# Patient Record
Sex: MALE | Race: WHITE
[De-identification: names, ages, dates, MRNs, and addresses within clinical notes are randomized per-mention and may not be internally consistent; named-entity substitution may affect disease eponyms.]

---

## 2017-06-22 ENCOUNTER — HOSPITAL ENCOUNTER (OUTPATIENT)
Dept: HOSPITAL 61 - PCVCCLINIC | Age: 82
Discharge: HOME | End: 2017-06-22
Attending: INTERNAL MEDICINE
Payer: MEDICARE

## 2017-06-22 DIAGNOSIS — E78.00: Primary | ICD-10-CM

## 2017-06-22 DIAGNOSIS — Z88.8: ICD-10-CM

## 2017-06-22 DIAGNOSIS — Z79.82: ICD-10-CM

## 2017-06-22 DIAGNOSIS — I10: ICD-10-CM

## 2017-06-22 DIAGNOSIS — M48.00: ICD-10-CM

## 2017-06-22 DIAGNOSIS — M25.552: ICD-10-CM

## 2017-06-22 DIAGNOSIS — N13.8: ICD-10-CM

## 2017-06-22 DIAGNOSIS — N40.1: ICD-10-CM

## 2017-06-22 DIAGNOSIS — I25.10: ICD-10-CM

## 2017-06-22 DIAGNOSIS — Z79.899: ICD-10-CM

## 2017-06-22 PROCEDURE — G0463 HOSPITAL OUTPT CLINIC VISIT: HCPCS

## 2017-06-22 PROCEDURE — 93005 ELECTROCARDIOGRAM TRACING: CPT

## 2017-11-21 ENCOUNTER — HOSPITAL ENCOUNTER (EMERGENCY)
Dept: HOSPITAL 35 - ER | Age: 82
Discharge: HOME | End: 2017-11-21
Payer: COMMERCIAL

## 2017-11-21 VITALS — BODY MASS INDEX: 26.6 KG/M2 | WEIGHT: 189.99 LBS | HEIGHT: 71 IN

## 2017-11-21 DIAGNOSIS — I10: Primary | ICD-10-CM

## 2017-11-21 DIAGNOSIS — N40.0: ICD-10-CM

## 2017-11-21 DIAGNOSIS — E78.00: ICD-10-CM

## 2017-11-21 DIAGNOSIS — Z88.8: ICD-10-CM

## 2017-11-21 DIAGNOSIS — Z87.891: ICD-10-CM

## 2017-11-21 DIAGNOSIS — F10.99: ICD-10-CM

## 2017-11-21 LAB
ANION GAP SERPL CALC-SCNC: 6 MMOL/L (ref 7–16)
BUN SERPL-MCNC: 15 MG/DL (ref 7–18)
CALCIUM SERPL-MCNC: 9.1 MG/DL (ref 8.5–10.1)
CHLORIDE SERPL-SCNC: 104 MMOL/L (ref 98–107)
CO2 SERPL-SCNC: 30 MMOL/L (ref 21–32)
CREAT SERPL-MCNC: 0.7 MG/DL (ref 0.7–1.3)
ERYTHROCYTE [DISTWIDTH] IN BLOOD BY AUTOMATED COUNT: 13.5 % (ref 10.5–14.5)
GLUCOSE SERPL-MCNC: 105 MG/DL (ref 74–106)
HCT VFR BLD CALC: 42.8 % (ref 42–52)
HGB BLD-MCNC: 14 GM/DL (ref 14–18)
MCH RBC QN AUTO: 27.7 PG (ref 26–34)
MCHC RBC AUTO-ENTMCNC: 32.7 G/DL (ref 28–37)
MCV RBC: 84.7 FL (ref 80–100)
PLATELET # BLD: 231 THOU/UL (ref 150–400)
POTASSIUM SERPL-SCNC: 4.2 MMOL/L (ref 3.5–5.1)
RBC # BLD AUTO: 5.05 MIL/UL (ref 4.5–6)
SODIUM SERPL-SCNC: 140 MMOL/L (ref 136–145)
TROPONIN I SERPL-MCNC: < 0.04 NG/ML (ref ?–0.06)
WBC # BLD AUTO: 6.4 THOU/UL (ref 4–11)

## 2017-11-22 ENCOUNTER — HOSPITAL ENCOUNTER (OUTPATIENT)
Dept: HOSPITAL 61 - PCVCCLINIC | Age: 82
Discharge: HOME | End: 2017-11-22
Attending: INTERNAL MEDICINE
Payer: MEDICARE

## 2017-11-22 DIAGNOSIS — E78.00: ICD-10-CM

## 2017-11-22 DIAGNOSIS — Z87.891: ICD-10-CM

## 2017-11-22 DIAGNOSIS — I25.10: Primary | ICD-10-CM

## 2017-11-22 DIAGNOSIS — I10: ICD-10-CM

## 2017-11-22 DIAGNOSIS — R93.1: ICD-10-CM

## 2017-11-22 DIAGNOSIS — Z79.899: ICD-10-CM

## 2017-11-22 DIAGNOSIS — Z79.82: ICD-10-CM

## 2017-11-22 DIAGNOSIS — I87.2: ICD-10-CM

## 2017-11-22 PROCEDURE — G0463 HOSPITAL OUTPT CLINIC VISIT: HCPCS

## 2017-11-22 PROCEDURE — 93005 ELECTROCARDIOGRAM TRACING: CPT

## 2017-11-27 ENCOUNTER — HOSPITAL ENCOUNTER (OUTPATIENT)
Dept: HOSPITAL 61 - PCVCIMAG | Age: 82
Discharge: HOME | End: 2017-11-27
Attending: INTERNAL MEDICINE
Payer: MEDICARE

## 2017-11-27 DIAGNOSIS — E83.59: ICD-10-CM

## 2017-11-27 DIAGNOSIS — I25.10: ICD-10-CM

## 2017-11-27 DIAGNOSIS — E78.5: ICD-10-CM

## 2017-11-27 DIAGNOSIS — I10: ICD-10-CM

## 2017-11-27 DIAGNOSIS — I08.1: Primary | ICD-10-CM

## 2017-11-27 PROCEDURE — 76770 US EXAM ABDO BACK WALL COMP: CPT

## 2017-11-27 PROCEDURE — 93306 TTE W/DOPPLER COMPLETE: CPT

## 2017-11-27 PROCEDURE — 93975 VASCULAR STUDY: CPT

## 2017-11-27 NOTE — PCVCIMAG
--------------- APPROVED REPORT --------------





Study performed:  2017 09:04:28



EXAM: Comprehensive 2D, Doppler, and color-flow 

Echocardiogram

Patient Location: Echo lab

Status:  routine



BSA:         2.06

HR: 83 bpmBP:          160/90 mmHg

Rhythm: NSR



Other Information 

Study Quality: Adequate



Risk Factors: 

Cardiac Risk Factors:  HTN, Hyperlipidemia



Indications

Coronary atherosclerosis by elevated calcium score



2D Dimensions

LVEF(%):  48.10 (&gt;50%)

IVSd:  14.07 (7-11mm)LVOT Diam:  21.35 (18-24mm) 

LVDd:  45.68 mm

PWd:  14.53 (7-11mm)Ascending Ao:  32.95 (22-36mm)

LVDs:  34.66 (25-40mm)

Left Atrium:  36.85 (27-40mm)

Aortic Root:  28.44 mm

LV Single Plane 4CH:  68.76 %

LV Single Plane 2CH:  62.22 %Wilkinson's LVEF:  65.49 %

Biplane EF:  64.4 %



Volumes

Left Atrial Volume (Systole)

Single Plane 4CH:  84.05 mLSingle Plane 2CH:  56.59 mL

LA ESV Index:  34.00 mL/m2



Aortic Valve

AoV Peak Jos.:  1.55 m/s

AO Peak Gr.:  9.63 mmHgLVOT Max P.06 mmHg

LVOT Max V:  1.01 m/s

JAMES Vmax: 2.32 cm2



Mitral Valve

E/A Ratio:  0.7

MV Decel. Time:  232.99 ms

MV E Max Jos.:  0.99 m/s

MV A Jos.:  1.42 m/s

IVRT:  107.27 ms



Pulmonary Valve

PV Peak Gr.:  2.44 mmHg



Tricuspid Valve

TR Peak Jos.:  2.38 m/s

TR Peak Gr.:  22.57 mmHg



Left Ventricle

The left ventricle is normal size. There is normal LV segmental wall 

motion. There is normal left ventricular wall thickness. Left 

ventricular systolic function is normal. The left ventricular 

ejection fraction is within the normal range. LVEF is 65%. Grade I - 

abnormal relaxation pattern.



Right Ventricle

The right ventricle is normal size. The right ventricular systolic 

function is normal.



Atria

The left atrium size is normal. The right atrium size is 

normal.



Aortic Valve

The aortic valve is normal in structure. No aortic regurgitation is 

present. There is no aortic valvular stenosis.



Mitral Valve

Severe mitral annular calcification. Moderately calcified mitral 

leaflets without stenosis. Trace mitral regurgitation. No evidence of 

mitral valve stenosis.



Tricuspid Valve

The tricuspid valve is normal in structure. Mild tricuspid 

regurgitation with PAP of 30 mmHg.



Pulmonic Valve

The pulmonary valve is normal in structure. There is no pulmonic 

valvular regurgitation.



Great Vessels

The aortic root is normal in size. IVC is normal in size and 

collapses with &gt;50% inspiration



Pericardium

There is no pericardial effusion.



&lt;Conclusion&gt;

The left ventricle is normal size.

There is normal left ventricular wall thickness.

LVEF is 65%.

Grade I - abnormal relaxation pattern.

The right ventricle is normal size.

The left atrium size is normal.

The aortic valve is normal in structure.

Severe mitral annular calcification. Moderately calcified mitral 

leaflets without stenosis.

Trace mitral regurgitation.

Mild tricuspid regurgitation with PAP of 30 mmHg.

There is no pericardial effusion.

## 2017-11-27 NOTE — PCVCIMAG
EXAM: BILATERAL RENAL ULTRASOUND AND BILATERAL RENAL DUPLEX



INDICATION: Hypertension



FINDINGS: 

Right kidney: Length measures 12.0 cm. No hydronephrosis or extensive

renal scarring. Incidental note is made of a 1.1 cm shadowing stone in

the lower pole.



Right renal duplex: Adequate technical quality. No sonographic

evidence of renal artery stenosis. The aortic to renal artery ratio is

1.7. The renal vein is patent.



Left kidney: Length measures 12.6 cm. No hydronephrosis or extensive

renal scarring.



Left renal duplex: Adequate technical quality. No sonographic evidence

of renal artery stenosis. The aortic to renal artery ratio is 1.8. The

renal vein is patent.



Bladder: No obvious abnormalities.



IMPRESSION: 

No significant renal artery stenosis.

No hydronephrosis bilaterally.

Nonobstructing right lower pole 1.1 cm calculus.



LOC:[id number]

## 2018-04-16 ENCOUNTER — HOSPITAL ENCOUNTER (OUTPATIENT)
Dept: HOSPITAL 61 - PCVCCLINIC | Age: 83
Discharge: HOME | End: 2018-04-16
Attending: INTERNAL MEDICINE
Payer: MEDICARE

## 2018-04-16 DIAGNOSIS — I10: Primary | ICD-10-CM

## 2018-04-16 DIAGNOSIS — Z82.49: ICD-10-CM

## 2018-04-16 DIAGNOSIS — Z79.82: ICD-10-CM

## 2018-04-16 DIAGNOSIS — E78.00: ICD-10-CM

## 2018-04-16 DIAGNOSIS — R93.1: ICD-10-CM

## 2018-04-16 DIAGNOSIS — I87.2: ICD-10-CM

## 2018-04-16 DIAGNOSIS — Z87.891: ICD-10-CM

## 2018-04-16 PROCEDURE — 93005 ELECTROCARDIOGRAM TRACING: CPT

## 2018-04-16 PROCEDURE — 80061 LIPID PANEL: CPT

## 2018-08-02 ENCOUNTER — APPOINTMENT (RX ONLY)
Dept: URBAN - METROPOLITAN AREA CLINIC 142 | Facility: CLINIC | Age: 83
Setting detail: DERMATOLOGY
End: 2018-08-02

## 2018-08-02 DIAGNOSIS — L91.8 OTHER HYPERTROPHIC DISORDERS OF THE SKIN: ICD-10-CM

## 2018-08-02 DIAGNOSIS — L82.1 OTHER SEBORRHEIC KERATOSIS: ICD-10-CM

## 2018-08-02 DIAGNOSIS — D22 MELANOCYTIC NEVI: ICD-10-CM

## 2018-08-02 DIAGNOSIS — D18.0 HEMANGIOMA: ICD-10-CM

## 2018-08-02 PROBLEM — E78.5 HYPERLIPIDEMIA, UNSPECIFIED: Status: ACTIVE | Noted: 2018-08-02

## 2018-08-02 PROBLEM — D22.5 MELANOCYTIC NEVI OF TRUNK: Status: ACTIVE | Noted: 2018-08-02

## 2018-08-02 PROBLEM — D18.01 HEMANGIOMA OF SKIN AND SUBCUTANEOUS TISSUE: Status: ACTIVE | Noted: 2018-08-02

## 2018-08-02 PROBLEM — I10 ESSENTIAL (PRIMARY) HYPERTENSION: Status: ACTIVE | Noted: 2018-08-02

## 2018-08-02 PROCEDURE — ? COUNSELING

## 2018-08-02 PROCEDURE — 99202 OFFICE O/P NEW SF 15 MIN: CPT

## 2018-08-02 ASSESSMENT — PAIN INTENSITY VAS: HOW INTENSE IS YOUR PAIN 0 BEING NO PAIN, 10 BEING THE MOST SEVERE PAIN POSSIBLE?: NO PAIN

## 2018-08-02 ASSESSMENT — LOCATION SIMPLE DESCRIPTION DERM
LOCATION SIMPLE: UPPER BACK
LOCATION SIMPLE: ABDOMEN
LOCATION SIMPLE: RIGHT THIGH
LOCATION SIMPLE: CHEST

## 2018-08-02 ASSESSMENT — LOCATION DETAILED DESCRIPTION DERM
LOCATION DETAILED: PERIUMBILICAL SKIN
LOCATION DETAILED: INFERIOR THORACIC SPINE
LOCATION DETAILED: RIGHT ANTERIOR LATERAL PROXIMAL THIGH
LOCATION DETAILED: LEFT LATERAL SUPERIOR CHEST

## 2018-08-02 ASSESSMENT — LOCATION ZONE DERM
LOCATION ZONE: TRUNK
LOCATION ZONE: LEG

## 2018-09-24 ENCOUNTER — HOSPITAL ENCOUNTER (OUTPATIENT)
Dept: HOSPITAL 61 - PCVCIMAG | Age: 83
Discharge: HOME | End: 2018-09-24
Attending: INTERNAL MEDICINE
Payer: MEDICARE

## 2018-09-24 DIAGNOSIS — I10: Primary | ICD-10-CM

## 2018-09-24 DIAGNOSIS — E78.5: ICD-10-CM

## 2018-09-24 DIAGNOSIS — I25.10: ICD-10-CM

## 2018-09-24 PROCEDURE — 93325 DOPPLER ECHO COLOR FLOW MAPG: CPT

## 2018-09-24 PROCEDURE — 93351 STRESS TTE COMPLETE: CPT

## 2018-09-24 NOTE — PCVCIMAG
--------------- APPROVED REPORT --------------





Study performed:  09/24/2018 10:24:29



Exam:  Stress Echocardiogram

Indication: Hyperlipidemia, Hypertension, Abnormal Calcium 

Score

Patient Location: Echo lab

Stress Nurse: Radha Franklin RN

Status: routine



Ht: 5 ft 11 in  

HR: 65 bpm      BP: 160/90 mmHg

Rhythm: NSR



Medical History

Medical History: Hyperlipidemia, HTN



Procedure

The patient underwent an Exercise Stress Test using the Tomas 

Protocol. Blood pressure, heart rate, and EKG were monitored.

An Echocardiogram was performed by technician in four stages in quad 

fashion.  At peak stress, four selected images were obtained and 

placed side by side with resting images for comparison.



Stress Test Details

Stress Test:  Exercise stress testing was performed using a Tomas 

protocol.

HR

Resting HR:            65 bpmMax Heart Rate (APMHR): 137 bpm 

Max HR Achieved:  125 bpmTarget HR (85% APMHR): 116 bpm

% of APMHR:         91

HR response to stress: Normal HR response to stress



BP

Resting BP:  160/90 mmHg

Max BP:       172/76 mmHg



ECG

Resting ECG:  Sinus Rhythm

Stress ECG:     Sinus Rhythm

Recovery ECG: Sinus Rhythm



Clinical

Reason for Termination: Maximal effort

Exercise duration: 6 min sec

Highest Stage Achieved: Stage 2: 2.5 mph at 12% grade. 

Exercise capacity: 7.00 METs

Overall Exercise Capacity for Age: Normal



Pre-Stress Echo

The resting Echocardiogram showed normal left ventricular 

contractility with an estimated Ejection Fraction of about &gt;55%. 



Post-Stress Echo

The stress Echocardiogram showed normal left ventricular 

contractility with an estimated Ejection Fraction of about 60-65%. 



Conclusion

Clinical Response:  Non-ischemic

Exercise Capacity:  Average

Stress ECG Response:  Non-ischemic

Stress Echo Images:  Non-ischemic



Other Information

Study Quality: Good

## 2019-06-24 ENCOUNTER — HOSPITAL ENCOUNTER (OUTPATIENT)
Dept: HOSPITAL 61 - PCVCCLINIC | Age: 84
Discharge: HOME | End: 2019-06-24
Attending: INTERNAL MEDICINE
Payer: MEDICARE

## 2019-06-24 DIAGNOSIS — I10: ICD-10-CM

## 2019-06-24 DIAGNOSIS — E78.00: Primary | ICD-10-CM

## 2019-06-24 PROCEDURE — 80061 LIPID PANEL: CPT

## 2019-06-24 PROCEDURE — G0463 HOSPITAL OUTPT CLINIC VISIT: HCPCS

## 2019-06-24 PROCEDURE — 36415 COLL VENOUS BLD VENIPUNCTURE: CPT

## 2019-06-24 PROCEDURE — 93005 ELECTROCARDIOGRAM TRACING: CPT

## 2019-09-24 ENCOUNTER — HOSPITAL ENCOUNTER (OUTPATIENT)
Dept: HOSPITAL 61 - PCVCIMAG | Age: 84
Discharge: HOME | End: 2019-09-24
Attending: INTERNAL MEDICINE
Payer: MEDICARE

## 2019-09-24 DIAGNOSIS — E78.5: ICD-10-CM

## 2019-09-24 DIAGNOSIS — Z82.49: ICD-10-CM

## 2019-09-24 DIAGNOSIS — M25.552: ICD-10-CM

## 2019-09-24 DIAGNOSIS — I87.2: ICD-10-CM

## 2019-09-24 DIAGNOSIS — N40.1: ICD-10-CM

## 2019-09-24 DIAGNOSIS — I11.9: ICD-10-CM

## 2019-09-24 DIAGNOSIS — R93.1: ICD-10-CM

## 2019-09-24 DIAGNOSIS — N13.8: ICD-10-CM

## 2019-09-24 DIAGNOSIS — I08.3: Primary | ICD-10-CM

## 2019-09-24 DIAGNOSIS — M48.00: ICD-10-CM

## 2019-09-24 DIAGNOSIS — E78.00: ICD-10-CM

## 2019-09-24 PROCEDURE — 93325 DOPPLER ECHO COLOR FLOW MAPG: CPT

## 2019-09-24 PROCEDURE — 93351 STRESS TTE COMPLETE: CPT

## 2019-09-24 NOTE — PCVCIMAG
--------------- APPROVED REPORT --------------





Study performed:  09/24/2019 09:55:18



Exam:  Stress Echocardiogram

Indication: Hypertension, Hyperlipidemia

Patient Location: Echo lab

Stress Nurse: Usha Dan RN

Status: routine



Ht: 5 ft 11 in  

HR: 64 bpm      BP: 168/64 mmHg

Rhythm: NSR



Procedure

The patient underwent an Exercise Stress Test using the Tomas 

Protocol. Blood pressure, heart rate, and EKG were monitored.

An Echocardiogram was performed by technician in four stages in quad 

fashion.  At peak stress, four selected images were obtained and 

placed side by side with resting images for comparison.



Stress Test Details

Stress Test:  Exercise stress testing was performed using a Tomas 

protocol.

HR

Resting HR:            64 bpmMax Heart Rate (APMHR): 136 bpm 

Max HR Achieved:  126 bpmTarget HR (85% APMHR): 115 bpm

% of APMHR:         92

Recovery HR:            79 bpm

HR response to stress: Normal HR response to stress



BP

Resting BP:  168/64 mmHg

Max BP:       180/86 mmHg

Recovery BP:       180/86 mmHg

BP response to stress: Normal blood pressure response to 

stress.

ECG

Resting ECG:  Sinus Rhythm

Stress ECG:     Sinus Rhythm

ST Change: Normal

Arrhythmia:    rare PVC

Recovery ECG: Sinus Rhythm

Recovery ST Change: Normal

Recovery Arrhythmia: None



Clinical

Reason for Termination: Maximal effort

Stress Symptoms: Dyspnea

Exercise duration: 5 min sec

Highest Stage Achieved: Stage 2: 2.5 mph at 12% grade. 

Exercise capacity: 7 METs

Overall Exercise Capacity for Age: Normal

Scale: Sedentary

Angina Score: None



Pre-Stress Echo

The resting Echocardiogram showed normal left ventricular 

contractility with an estimated Ejection Fraction of about >55%. 

The resting echocardiogram demonstrated normal wall motion in all 

wall segments.  



Post-Stress Echo

The stress Echocardiogram showed normal left ventricular 

contractility with an estimated Ejection Fraction of about 60-65%. 

Compared to rest, there were no stress-induced wall motion 

abnormalities. 



Clinical

No clinical or ECG evidence for ischemia.



Conclusion

Clinical Response:  Non-ischemic

Exercise Capacity:  Average

Stress ECG Response:  Non-ischemic

Stress Echo Images:  Non-ischemic

The left ventricle is normal in size and moderate LVH in both the 

rest and stress images.

Moderate mitral leaflet calcification with mild regurgitation and no 

stenosis. Mild aortic sclerosis without stenosis or 

regurgitation.

Mild tricuspid regurgitation with PAP of 41 mmHg and trace pulmonic 

regurgitation.



Other Information

Study Quality: Adequate



<Conclusion>

The left ventricle is normal in size and moderate LVH in both the 

rest and stress images.

Moderate mitral leaflet calcification with mild regurgitation and no 

stenosis. Mild aortic sclerosis without stenosis or 

regurgitation.

Mild tricuspid regurgitation with PAP of 41 mmHg and trace pulmonic 

regurgitation.

## 2020-06-17 ENCOUNTER — HOSPITAL ENCOUNTER (OUTPATIENT)
Dept: HOSPITAL 35 - SJCVC | Age: 85
End: 2020-06-17
Attending: INTERNAL MEDICINE
Payer: COMMERCIAL

## 2020-06-17 DIAGNOSIS — Z87.891: ICD-10-CM

## 2020-06-17 DIAGNOSIS — E78.00: ICD-10-CM

## 2020-06-17 DIAGNOSIS — I10: Primary | ICD-10-CM

## 2020-06-17 DIAGNOSIS — Z79.899: ICD-10-CM

## 2020-06-17 DIAGNOSIS — I87.2: ICD-10-CM

## 2020-06-17 DIAGNOSIS — I34.0: ICD-10-CM

## 2021-04-15 ENCOUNTER — HOSPITAL ENCOUNTER (OUTPATIENT)
Dept: HOSPITAL 35 - SJCVCIMAG | Age: 86
End: 2021-04-15
Attending: INTERNAL MEDICINE
Payer: COMMERCIAL

## 2021-04-15 DIAGNOSIS — Z79.82: ICD-10-CM

## 2021-04-15 DIAGNOSIS — Z79.899: ICD-10-CM

## 2021-04-15 DIAGNOSIS — R53.83: ICD-10-CM

## 2021-04-15 DIAGNOSIS — I10: ICD-10-CM

## 2021-04-15 DIAGNOSIS — R06.00: ICD-10-CM

## 2021-04-15 DIAGNOSIS — I08.1: Primary | ICD-10-CM

## 2021-04-15 DIAGNOSIS — E78.5: ICD-10-CM

## 2021-05-27 ENCOUNTER — HOSPITAL ENCOUNTER (OUTPATIENT)
Dept: HOSPITAL 35 - SJCVC | Age: 86
End: 2021-05-27
Attending: INTERNAL MEDICINE
Payer: COMMERCIAL

## 2021-05-27 DIAGNOSIS — N40.0: ICD-10-CM

## 2021-05-27 DIAGNOSIS — I49.1: ICD-10-CM

## 2021-05-27 DIAGNOSIS — E78.00: ICD-10-CM

## 2021-05-27 DIAGNOSIS — I10: ICD-10-CM

## 2021-05-27 DIAGNOSIS — Z87.891: ICD-10-CM

## 2021-05-27 DIAGNOSIS — I34.0: ICD-10-CM

## 2021-05-27 DIAGNOSIS — Z88.8: ICD-10-CM

## 2021-05-27 DIAGNOSIS — Z79.899: ICD-10-CM

## 2021-05-27 DIAGNOSIS — Z79.82: ICD-10-CM

## 2021-05-27 DIAGNOSIS — R94.31: Primary | ICD-10-CM

## 2021-05-27 DIAGNOSIS — Z82.49: ICD-10-CM
